# Patient Record
Sex: MALE | Race: WHITE | NOT HISPANIC OR LATINO | Employment: FULL TIME | URBAN - METROPOLITAN AREA
[De-identification: names, ages, dates, MRNs, and addresses within clinical notes are randomized per-mention and may not be internally consistent; named-entity substitution may affect disease eponyms.]

---

## 2022-08-03 DIAGNOSIS — M50.30 BULGING OF CERVICAL INTERVERTEBRAL DISC: Primary | ICD-10-CM

## 2022-08-10 ENCOUNTER — TELEPHONE (OUTPATIENT)
Dept: NEUROSURGERY | Facility: CLINIC | Age: 37
End: 2022-08-10
Payer: COMMERCIAL

## 2022-08-10 DIAGNOSIS — M50.30 BULGING OF CERVICAL INTERVERTEBRAL DISC: Primary | ICD-10-CM

## 2022-08-11 ENCOUNTER — TELEPHONE (OUTPATIENT)
Dept: NEUROSURGERY | Facility: CLINIC | Age: 37
End: 2022-08-11
Payer: COMMERCIAL

## 2022-08-16 ENCOUNTER — TELEPHONE (OUTPATIENT)
Dept: NEUROSURGERY | Facility: CLINIC | Age: 37
End: 2022-08-16
Payer: COMMERCIAL

## 2022-08-17 NOTE — TELEPHONE ENCOUNTER
I called the patient to schedule an appointment; no ans lm  (3rd attempt) If pt calls back will schedule    ----- Message -----   From: Luz Villafana PA-C   Sent: 8/9/2022   4:35 PM CDT   To: [facility] Neurosurgery Clinical Support Staff   Subject: FW: REFERRAL PROCESS-BULGING OF CERVICAL INV*     My schedule, next available on day Kael is here.  Cervical x-rays with flex/ext views.     ----- Message -----   From: Rafael Burnette MA   Sent: 8/8/2022   9:32 AM CDT   To: Luz Villafana PA-C   Subject: FW: REFERRAL PROCESS-BULGING OF CERVICAL INV*       ----- Message -----   From: Rafael Burnette MA   Sent: 8/4/2022   9:52 AM CDT   To: Ashley Cabrera [facility]   Subject: REFERRAL PROCESS-BULGING OF CERVICAL INVERTE*     Patient was referred to Dr. Scruggs by Dr. Fang for bulging of cervical invertebral disc. This patient is a previous patient of Dr. Scruggs last seen in 2017. All of his previous OV notes, imaging and current imaging is in Epic, please review for severity and let me know where to schedule. Thanks!